# Patient Record
Sex: FEMALE | Race: BLACK OR AFRICAN AMERICAN
[De-identification: names, ages, dates, MRNs, and addresses within clinical notes are randomized per-mention and may not be internally consistent; named-entity substitution may affect disease eponyms.]

---

## 2019-09-20 ENCOUNTER — HOSPITAL ENCOUNTER (OUTPATIENT)
Dept: HOSPITAL 35 - SLEEPLAB | Age: 64
End: 2019-09-20
Attending: INTERNAL MEDICINE
Payer: COMMERCIAL

## 2019-09-20 DIAGNOSIS — G47.34: ICD-10-CM

## 2019-09-20 DIAGNOSIS — G47.33: Primary | ICD-10-CM

## 2019-09-20 DIAGNOSIS — I10: ICD-10-CM

## 2019-09-20 DIAGNOSIS — R09.02: ICD-10-CM

## 2019-09-23 NOTE — SLE
[facility]
[address]
[place]   [address]                     POLYSOMNOGRAPHY STUDY         
_______________________________________________________________________________
 
Name:       WRIGHT,MERLIN L               Room #:                     REG [facility]#:      [id number]                       Account #:      [id number]  
Admission:  09/20/19    Attend Phys:    Dennis Peres MD      
Discharge:              Date of Birth:  [birth year]  
                                                          Report #: [id number]
                                                                    [id number]   
_______________________________________________________________________________
THIS REPORT FOR:   //name//                          
 
CC: Dennis Ramirez 
    Physician staff
 
DATE OF SERVICE:  09/20/2019
 
 
ATTENDING PHYSICIAN:  Gisel Ramirez.
 
The patient is 64 years old who weighs 180 pounds with a BMI of 30.  The
patient's [place] score was 5.  The patient underwent a split night study
performed at [facility].
 
During the night study, the patient spent 469 minutes in bed and slept for 214
minutes with a low sleep efficiency of 45%.  Sleep latency was 24 minutes with a
REM latency of 257 minutes.  Sleep architecture showed increased stage 1 and
stage 2 sleep, absent slow wave and reduced REM sleep.  During the initial
diagnostic portion of the study, the patient slept for 111 minutes.  During that
time, there were 12 obstructive apneas, 1 mixed and 1 central apneas and 48
hypopneas.  The patient's apnea hypopnea index was 33 per hour.  REM sleep was
not seen during the diagnostic portion.  Supine AHI of 33 per hour.
 
EKG monitoring revealed normal sinus rhythm.  Average heart rate 88 beats per
minute.
 
No clinically significant PLM seen during the diagnostic portion.
 
Nocturnal oximetry study revealed an average oxygen saturation of 94% with a
lowest of 83%.  1.8 minutes were spent in oxygen saturation of less than 89%.
 
The patient met the criteria for CPAP initiation.  It was started at 9 cm water
and titrated up to 14 cm water.  At the final pressure, the patient slept for
16.9 minutes including 6 minutes of supine REM sleep.  The patient's AHI was
reduced to 0 per hour and oxygen saturation remained above 96%.
 
IMPRESSION:
1.  Severe sleep apnea-hypopnea syndrome at an AHI of 33 per hour.
2.  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
maintenance insomnia.
3.  No clinically significant PLM seen during the diagnostic portion of the
study, although the PLMS did increase while the patient slept on therapeutic
CPAP pressure.
4.  Mild nocturnal hypoxia secondary to RASHAD, but resolved with CPAP.
 
 
 
 
[facility]
[address]
[place]   [address]                     POLYSOMNOGRAPHY STUDY         
_______________________________________________________________________________
 
Name:       WRIGHT,MERLIN L               Room #:                     REG [facility]#:      [id number]                       Account #:      [id number]  
Admission:  09/20/19    Attend Phys:    Dennis Peres MD      
Discharge:              Date of Birth:  [birth year]  
                                                          Report #: [id number]
                                                                    [id number]   
_______________________________________________________________________________
RECOMMENDATIONS:
1.  CPAP at 14 cm water completely eliminated the patient's sleep apnea and
should be used on a nightly basis.
2.  Follow up in 4-6 weeks to assess compliance with CPAP and to document
clinical improvement.
3.  Weight loss is advised.
4.  Avoid CNS depressants.
5.  Cautioned regarding driving until symptoms of sleep apnea resolve with the
use of CPAP.
6.  The patient's insomnia should be further evaluated and treated if it
persists despite effective use of CPAP.
7.  The patient should also be further evaluated for symptoms of restless legs
during the day.
 
 
 
 
 
 
 
 
 
 
 
 
 
 
 
 
 
 
 
 
 
 
 
 
 
 
 
 
 
 
 
  <ELECTRONICALLY SIGNED>
   By: Dennis Peres MD              
  09/23/19     2034
D: 09/23/19 1635                           _____________________________________
T: 09/23/19 1821                           Dennis Peres MD                /nt